# Patient Record
Sex: FEMALE | Employment: OTHER | URBAN - METROPOLITAN AREA
[De-identification: names, ages, dates, MRNs, and addresses within clinical notes are randomized per-mention and may not be internally consistent; named-entity substitution may affect disease eponyms.]

---

## 2024-02-26 ENCOUNTER — HOSPITAL ENCOUNTER (OUTPATIENT)
Dept: PHYSICAL THERAPY | Age: 69
Setting detail: THERAPIES SERIES
Discharge: HOME | End: 2024-02-26
Payer: MEDICARE

## 2024-02-26 DIAGNOSIS — M25.512 LEFT SHOULDER PAIN, UNSPECIFIED CHRONICITY: Primary | ICD-10-CM

## 2024-02-26 DIAGNOSIS — S42.202A UNSPECIFIED FRACTURE OF UPPER END OF LEFT HUMERUS, INITIAL ENCOUNTER FOR CLOSED FRACTURE: ICD-10-CM

## 2024-02-26 PROCEDURE — 97140 MANUAL THERAPY 1/> REGIONS: CPT

## 2024-02-26 PROCEDURE — 97110 THERAPEUTIC EXERCISES: CPT

## 2024-02-26 PROCEDURE — 97161 PT EVAL LOW COMPLEX 20 MIN: CPT

## 2024-02-29 ENCOUNTER — HOSPITAL ENCOUNTER (OUTPATIENT)
Dept: PHYSICAL THERAPY | Age: 69
Setting detail: THERAPIES SERIES
Discharge: HOME | End: 2024-02-29
Payer: MEDICARE

## 2024-02-29 PROCEDURE — 97140 MANUAL THERAPY 1/> REGIONS: CPT

## 2024-02-29 PROCEDURE — 97110 THERAPEUTIC EXERCISES: CPT

## 2024-02-29 NOTE — FLOWSHEET NOTE
10% or less for the Quick DASH to assist with return top prior level of function.   Status: [] Progressing: [] Met: [] Not Met: [] Adjusted  Patient will improve L shoulder ROM to within 5 degrees of uninvolved, dominant R UE  to allow for proper joint functioning as indicated by patients functional deficits.  Status: [] Progressing: [] Met: [] Not Met: [] Adjusted  Patient will improve L shoulder strength to within at least 5# HHD compared to uninvolved, dominant R UE to work towards return to PLOF.    Status: [] Progressing: [] Met: [] Not Met: [] Adjusted  Patient will be able to perform all bathing and dressing tasks including washing her hair, washing her back, reaching backwards into a coat sleeve, etc without increased symptoms or restriction to work towards return to prior level of function.        Status: [] Progressing: [] Met: [] Not Met: [] Adjusted  Patient will be able to perform all household chores including washing dishes and putting them away in the cabinets, vacuuming, and laundry without increased symptoms or restriction to work towards return to PLOF.  Status: [] Progressing: [] Met: [] Not Met: [] Adjusted  Patient will be able to comfortably drive at least 1 hour without increased symptoms or restriction in L UE.    Status: [] Progressing: [] Met: [] Not Met: [] Adjusted    TREATMENT PLAN     Frequency/Duration: 1-2x/week for 4-6 weeks for the following treatment interventions:    Interventions:  [x] Therapeutic exercise including: strength training, ROM, including postural re-education.   [x] NMR activation and proprioception, including postural re-education.    [x] Manual therapy as indicated to include: PROM, Gr I-IV mobilizations, STM, and Dry Needling/IASTM  [x] Modalities as needed that may include:  DN/estim  [x] Patient education on joint protection, postural re-education, activity modification, progression of HEP.        [] Aquatic Therapy    Plan: POC initiated as per

## 2024-03-04 ENCOUNTER — HOSPITAL ENCOUNTER (OUTPATIENT)
Dept: PHYSICAL THERAPY | Age: 69
Setting detail: THERAPIES SERIES
Discharge: HOME | End: 2024-03-04
Payer: MEDICARE

## 2024-03-04 PROCEDURE — 97110 THERAPEUTIC EXERCISES: CPT

## 2024-03-04 PROCEDURE — 97140 MANUAL THERAPY 1/> REGIONS: CPT

## 2024-03-04 NOTE — FLOWSHEET NOTE
[facility] - Outpatient Rehabilitation and Therapy [address] [place] [address] office: [phone] fax: [phone]    Physical Therapy: TREATMENT/PROGRESS NOTE   Patient: Navya Domínguez (68 y.o. female)   Examination Date: 2024   :  1955 MRN: [id number]   Visit #: 3   Insurance Allowable Auth Needed   [place]  9 visits approved []Yes    [x]No    Insurance: Payor: AETNA MEDICARE / Plan: AETNA MEDICARE-ADVANTAGE PPO / Product Type: Medicare /   Insurance ID: [id number] - (Medicare Managed)  Secondary Insurance (if applicable):    Treatment Diagnosis:   1. Left shoulder pain, unspecified chronicity  M25.512         2. Unspecified fracture of upper end of left humerus, initial encounter for closed fracture  S42         Medical Diagnosis:  Left shoulder pain [M25.512]   Referring Physician: Jose Angel Arriaga DO  PCP: Bird Joaquin PA       Plan of care signed (Y/N): Y    Date of Patient follow up with Physician:      Progress Report/POC: 3/29/24  POC update due: (10 visits /OR AUTH LIMITS, whichever is less)  4/3/2024                                             Precautions/ Contra-indications:           Latex allergy:  NO  Pacemaker:    NO  Contraindications for Manipulation: None  Date of Surgery: NA  Other:    Red Flags:  None    C-SSRS Triggered by Intake questionnaire:   [x] No, Questionnaire did not trigger screening.   [] Yes, Patient intake triggered further evaluation      [] C-SSRS Screening completed  [] PCP notified via Plan of Care  [] Emergency services notified     Preferred Language for Healthcare:   [x] English       [] other:    SUBJECTIVE EXAMINATION     Patient stated complaint:   Still getting pretty sore in her L shoulder after performing HEP at home.  Usually soreness lasts about an hour after HEP performance. Has intermittently iced her shoulder to help with that soreness. Trying not to take any ibuprofen or Tylenol if she can help it.        Test used

## 2024-03-07 ENCOUNTER — HOSPITAL ENCOUNTER (OUTPATIENT)
Dept: PHYSICAL THERAPY | Age: 69
Setting detail: THERAPIES SERIES
Discharge: HOME | End: 2024-03-07
Payer: MEDICARE

## 2024-03-07 PROCEDURE — 97140 MANUAL THERAPY 1/> REGIONS: CPT

## 2024-03-07 PROCEDURE — 97110 THERAPEUTIC EXERCISES: CPT

## 2024-03-07 NOTE — FLOWSHEET NOTE
symptoms or restriction to work towards return to PLOF.  Status: [] Progressing: [] Met: [] Not Met: [] Adjusted  Patient will be able to comfortably drive at least 1 hour without increased symptoms or restriction in L UE.    Status: [] Progressing: [] Met: [] Not Met: [] Adjusted    TREATMENT PLAN     Frequency/Duration: 1-2x/week for 4-6 weeks for the following treatment interventions:    Interventions:  [x] Therapeutic exercise including: strength training, ROM, including postural re-education.   [x] NMR activation and proprioception, including postural re-education.    [x] Manual therapy as indicated to include: PROM, Gr I-IV mobilizations, STM, and Dry Needling/IASTM  [x] Modalities as needed that may include:  DN/estim  [x] Patient education on joint protection, postural re-education, activity modification, progression of HEP.        [] Aquatic Therapy    Plan: POC initiated as per evaluation    Electronically Signed by Gloria Moraes PT  Date: 03/07/2024    Note: Portions of this note have been templated and/or copied from initial evaluation, reassessments and prior notes for documentation efficiency.

## 2024-03-11 ENCOUNTER — HOSPITAL ENCOUNTER (OUTPATIENT)
Dept: PHYSICAL THERAPY | Age: 69
Setting detail: THERAPIES SERIES
Discharge: HOME | End: 2024-03-11
Payer: MEDICARE

## 2024-03-11 PROCEDURE — 97110 THERAPEUTIC EXERCISES: CPT

## 2024-03-11 PROCEDURE — 97140 MANUAL THERAPY 1/> REGIONS: CPT

## 2024-03-11 NOTE — FLOWSHEET NOTE
Minutes 43        Charge Justification:  (84839) THERAPEUTIC EXERCISE - Provided verbal/tactile cueing for activities related to strengthening, flexibility, endurance, ROM performed to prevent loss of range of motion, maintain or improve muscular strength or increase flexibility, following either an injury or surgery.   (05837) HOME EXERCISE PROGRAM - Reviewed/Progressed HEP activities related to strengthening, flexibility, endurance, ROM performed to prevent loss of range of motion, maintain or improve muscular strength or increase flexibility, following either an injury or surgery.  (29866) NEUROMUSCULAR RE-EDUCATION - Therapeutic procedure, 1 or more areas, each 15 minutes; neuromuscular reeducation of movement, balance, coordination, kinesthetic sense, posture, and/or proprioception for sitting and/or standing activities  (72903) HOME EXERCISE PROGRAM - Reviewed/Progressed HEP activities related to neuromuscular reeducation of movement, balance, coordination, kinesthetic sense, posture, and/or proprioception for sitting and/or standing activities    (42058) THERAPEUTIC ACTIVITY - use of dynamic activities to improve functional performance. (Ex include squatting, ascending/descending stairs, walking, bending, lifting, catching, throwing, pushing, pulling, jumping.)  Direct, one on one contact, billed in 15-minute increments.  (89661) MANUAL THERAPY -  Manual therapy techniques, 1 or more regions, each 15 minutes (Mobilization/manipulation, manual lymphatic drainage, manual traction) for the purpose of modulating pain, promoting relaxation,  increasing ROM, reducing/eliminating soft tissue swelling/inflammation/restriction, improving soft tissue extensibility and allowing for proper ROM for normal function with self care, mobility, lifting and ambulation  (44171) Needle insertion(s) without injection; 1 or 2 muscle(s).  (63365) Needle insertion(s) without injection; 3 or more muscle(s)  (17493) ATTENDED ESTIM.

## 2024-03-14 ENCOUNTER — HOSPITAL ENCOUNTER (OUTPATIENT)
Dept: PHYSICAL THERAPY | Age: 69
Setting detail: THERAPIES SERIES
Discharge: HOME | End: 2024-03-14
Payer: MEDICARE

## 2024-03-14 PROCEDURE — 97110 THERAPEUTIC EXERCISES: CPT

## 2024-03-14 PROCEDURE — 97140 MANUAL THERAPY 1/> REGIONS: CPT

## 2024-03-14 NOTE — FLOWSHEET NOTE
[facility] - Outpatient Rehabilitation and Therapy [address] [place] [address] office: [phone] fax: [phone]    Physical Therapy: TREATMENT/PROGRESS NOTE   Patient: Navya Domínguez (68 y.o. female)   Examination Date: 2024   :  1955 MRN: [id number]   Visit #: 6   Insurance Allowable Auth Needed   [place] []Yes    [x]No    Insurance: Payor: AETNA MEDICARE / Plan: AETNA MEDICARE-ADVANTAGE PPO / Product Type: Medicare /   Insurance ID: [id number] - (Medicare Managed)  Secondary Insurance (if applicable):    Treatment Diagnosis:   1. Left shoulder pain, unspecified chronicity  M25.512         2. Unspecified fracture of upper end of left humerus, initial encounter for closed fracture  S42         Medical Diagnosis:  Left shoulder pain [M25.512]   Referring Physician: Jose Angel Arriaga DO  PCP: Bird Joaquin PA       Plan of care signed (Y/N): Y    Date of Patient follow up with Physician:      Progress Report/POC: 3/29/24  POC update due: (10 visits /OR AUTH LIMITS, whichever is less)  2024                                             Precautions/ Contra-indications:           Latex allergy:  NO  Pacemaker:    NO  Contraindications for Manipulation: None  Date of Surgery: NA  Other:    Red Flags:  None    C-SSRS Triggered by Intake questionnaire:   [x] No, Questionnaire did not trigger screening.   [] Yes, Patient intake triggered further evaluation      [] C-SSRS Screening completed  [] PCP notified via Plan of Care  [] Emergency services notified     Preferred Language for Healthcare:   [x] English       [] other:    SUBJECTIVE EXAMINATION     Patient stated complaint:   States that for the first time in 15 weeks she was able to pull her arm out of her long sleeve sweatshirt above her head and she was able to straighten her hair using her L arm with only mild discomfort in the L shoulder.        Test used Initial score  2024   Pain Summary VAS 4-7/10

## 2024-03-18 ENCOUNTER — HOSPITAL ENCOUNTER (OUTPATIENT)
Dept: PHYSICAL THERAPY | Age: 69
Setting detail: THERAPIES SERIES
Discharge: HOME | End: 2024-03-18
Payer: MEDICARE

## 2024-03-18 PROCEDURE — 97110 THERAPEUTIC EXERCISES: CPT

## 2024-03-18 PROCEDURE — 97140 MANUAL THERAPY 1/> REGIONS: CPT

## 2024-03-18 NOTE — FLOWSHEET NOTE
Aquatic Therex (62156)    Dry Needle 3+ muscle (00926)     Iontophoresis (31180)    VASO (44021)     Ultrasound (29525)    Group Therapy (93831)     Estim Attended (03785)    Canalith Repositioning (63740)     Other:    Other:    Total Timed Code Tx Minutes 45 3       Total Treatment Minutes 45        Charge Justification:  (83734) THERAPEUTIC EXERCISE - Provided verbal/tactile cueing for activities related to strengthening, flexibility, endurance, ROM performed to prevent loss of range of motion, maintain or improve muscular strength or increase flexibility, following either an injury or surgery.   (62736) HOME EXERCISE PROGRAM - Reviewed/Progressed HEP activities related to strengthening, flexibility, endurance, ROM performed to prevent loss of range of motion, maintain or improve muscular strength or increase flexibility, following either an injury or surgery.  (70917) NEUROMUSCULAR RE-EDUCATION - Therapeutic procedure, 1 or more areas, each 15 minutes; neuromuscular reeducation of movement, balance, coordination, kinesthetic sense, posture, and/or proprioception for sitting and/or standing activities  (84736) HOME EXERCISE PROGRAM - Reviewed/Progressed HEP activities related to neuromuscular reeducation of movement, balance, coordination, kinesthetic sense, posture, and/or proprioception for sitting and/or standing activities    (91819) THERAPEUTIC ACTIVITY - use of dynamic activities to improve functional performance. (Ex include squatting, ascending/descending stairs, walking, bending, lifting, catching, throwing, pushing, pulling, jumping.)  Direct, one on one contact, billed in 15-minute increments.  (59470) MANUAL THERAPY -  Manual therapy techniques, 1 or more regions, each 15 minutes (Mobilization/manipulation, manual lymphatic drainage, manual traction) for the purpose of modulating pain, promoting relaxation,  increasing ROM, reducing/eliminating soft tissue swelling/inflammation/restriction, improving

## 2024-03-21 ENCOUNTER — HOSPITAL ENCOUNTER (OUTPATIENT)
Dept: PHYSICAL THERAPY | Age: 69
Setting detail: THERAPIES SERIES
Discharge: HOME | End: 2024-03-21
Payer: MEDICARE

## 2024-03-21 PROCEDURE — 97140 MANUAL THERAPY 1/> REGIONS: CPT

## 2024-03-21 PROCEDURE — 97110 THERAPEUTIC EXERCISES: CPT

## 2024-03-21 NOTE — FLOWSHEET NOTE
laundry without increased symptoms or restriction to work towards return to PLOF.  Status: [] Progressing: [] Met: [] Not Met: [] Adjusted  Patient will be able to comfortably drive at least 1 hour without increased symptoms or restriction in L UE.    Status: [] Progressing: [] Met: [] Not Met: [] Adjusted    TREATMENT PLAN     Frequency/Duration: 1-2x/week for 4-6 weeks for the following treatment interventions:    Interventions:  [x] Therapeutic exercise including: strength training, ROM, including postural re-education.   [x] NMR activation and proprioception, including postural re-education.    [x] Manual therapy as indicated to include: PROM, Gr I-IV mobilizations, STM, and Dry Needling/IASTM  [x] Modalities as needed that may include:  DN/estim  [x] Patient education on joint protection, postural re-education, activity modification, progression of HEP.        [] Aquatic Therapy    Plan: 2x per week for 4-6 weeks    Electronically Signed by Gloria Moraes, GIOVANNA  Date: 03/21/2024    Note: Portions of this note have been templated and/or copied from initial evaluation, reassessments and prior notes for documentation efficiency.

## 2024-03-25 ENCOUNTER — APPOINTMENT (OUTPATIENT)
Dept: PHYSICAL THERAPY | Age: 69
End: 2024-03-25
Payer: MEDICARE

## 2024-03-28 ENCOUNTER — HOSPITAL ENCOUNTER (OUTPATIENT)
Dept: PHYSICAL THERAPY | Age: 69
Setting detail: THERAPIES SERIES
Discharge: HOME | End: 2024-03-28
Payer: MEDICARE

## 2024-03-28 PROCEDURE — 97110 THERAPEUTIC EXERCISES: CPT

## 2024-03-28 PROCEDURE — 97140 MANUAL THERAPY 1/> REGIONS: CPT

## 2024-03-28 NOTE — FLOWSHEET NOTE
before she goes back to see physician next Tuesday.        Test used Initial score  2/26/24 03/28/2024   Pain Summary VAS 4-7/10    Functional questionnaire Quick DASH 30% disability     Other:              Pain:  Pain location: L superior, lateral shoulder  Patient describes pain to be constant, intermittent, Sharp, dull, and aching  Pain decreases with: Medication  Pain increases with: Activity and Movement, reaching behind her back, bathing, dressing, driving, reaching to put her seat belt on with her L arm, sleeping directly on the L side, lifting any weight >5# with L UE     Relevant Medical History: High BP. Anxiety. History of adhesive capsulitis in both shoulders.    Occupation/School:  Work/School Status: Retired    Job Duties/Demands: Takes care of her grand kids now as needed    Sport/ Recreation/ Leisure/ Hobbies: Patient wanted to start working out in January, but she hasn't been able to do that because of this injury.     OBJECTIVE EXAMINATION     2/26/24  ROM/Strength: (Blank cells denote NT)    Mvmt (norm) AROM L AROM R Notes PROM L PROM R Notes               CERVICAL Flex (60)        Ext (70)        SB(45)          Rotation (80)                       SHOULDER Flexion (180) 85 p! Beyond that range. 155        Abduction (180) 73 p! Beyond that range. 142        ER -0 L-sided neck, occiput C5 Discomfort in posterior R shoulder.        ER -90 (90)          IR -0 L buttock, hip T8        IR -90 (70)                     ELBOW Flex/biceps (140)          Ext/triceps (0)          Pronation (80)          Supination (80)                       WRIST Flexion (60)          Extension (60)          RD (20)          UD (20)                                   MMT L MMT R Notes            CERVICAL Cerv flexion       Cerv extension       Cerv SB       Cerv rotation                 SHOULDER Flexion       Abduction       ER -0       ER -90       IR -0       IR -90               ELBOW Flex/biceps

## 2024-04-01 ENCOUNTER — HOSPITAL ENCOUNTER (OUTPATIENT)
Dept: PHYSICAL THERAPY | Age: 69
Setting detail: THERAPIES SERIES
Discharge: HOME | End: 2024-04-01
Payer: MEDICARE

## 2024-04-01 PROCEDURE — 97110 THERAPEUTIC EXERCISES: CPT

## 2024-04-01 PROCEDURE — 97140 MANUAL THERAPY 1/> REGIONS: CPT

## 2024-04-04 ENCOUNTER — HOSPITAL ENCOUNTER (OUTPATIENT)
Dept: PHYSICAL THERAPY | Age: 69
Setting detail: THERAPIES SERIES
Discharge: HOME | End: 2024-04-04
Payer: MEDICARE

## 2024-04-04 PROCEDURE — 97140 MANUAL THERAPY 1/> REGIONS: CPT

## 2024-04-04 PROCEDURE — 97110 THERAPEUTIC EXERCISES: CPT

## 2024-04-04 NOTE — FLOWSHEET NOTE
activities  (12144) HOME EXERCISE PROGRAM - Reviewed/Progressed HEP activities related to neuromuscular reeducation of movement, balance, coordination, kinesthetic sense, posture, and/or proprioception for sitting and/or standing activities    (77468) THERAPEUTIC ACTIVITY - use of dynamic activities to improve functional performance. (Ex include squatting, ascending/descending stairs, walking, bending, lifting, catching, throwing, pushing, pulling, jumping.)  Direct, one on one contact, billed in 15-minute increments.  (50862) MANUAL THERAPY -  Manual therapy techniques, 1 or more regions, each 15 minutes (Mobilization/manipulation, manual lymphatic drainage, manual traction) for the purpose of modulating pain, promoting relaxation,  increasing ROM, reducing/eliminating soft tissue swelling/inflammation/restriction, improving soft tissue extensibility and allowing for proper ROM for normal function with self care, mobility, lifting and ambulation  (15410) Needle insertion(s) without injection; 1 or 2 muscle(s).  (21510) Needle insertion(s) without injection; 3 or more muscle(s)  (24260) ATTENDED ESTIM. Application of a modality to 1 or more areas; electrical stimulation (manual), each 15 minutes. Attended electrical stimulation requires direct (1-on-1) contact with the patient by the qualified professional/qualified personnel in providing electrical stimulation manually through the use of probes or other devices.      GOALS     Patient stated goal: Be able to do normal daily activities.  Status:  [x] Progressing: [] Met: [x] Not Met: [] Adjusted  Therapist goals for Patient:   Short Term Goals: To be achieved in: 2 weeks  Independent in HEP and progression per patient tolerance, in order to progress toward full function and prevent re-injury.    Status: [] Progressing: [x] Met: [] Not Met: [] Adjusted  Patient will have a decrease in pain to </=2/10 to help facilitate improvement in movement, function, and ADLs as

## 2024-04-08 ENCOUNTER — HOSPITAL ENCOUNTER (OUTPATIENT)
Dept: PHYSICAL THERAPY | Age: 69
Setting detail: THERAPIES SERIES
Discharge: HOME | End: 2024-04-08
Payer: MEDICARE

## 2024-04-08 PROCEDURE — 97140 MANUAL THERAPY 1/> REGIONS: CPT

## 2024-04-08 PROCEDURE — 97110 THERAPEUTIC EXERCISES: CPT

## 2024-04-08 NOTE — FLOWSHEET NOTE
injury. Will send to concussion specialist if dizziness does not continue to improve in the next few weeks. Reports a lot less soreness in her L shoulder today than she had on Thursday last week.        Test used Initial score  2/26/24 04/08/2024   Pain Summary VAS 4-7/10 0-4/10   Functional questionnaire Quick DASH 30% disability  30% disability   Other:              Pain:  Pain location: L superior, lateral shoulder  Patient describes pain to be constant, intermittent, Sharp, dull, and aching  Pain decreases with: Medication  Pain increases with: Activity and Movement, reaching behind her back, bathing, dressing, driving, reaching to put her seat belt on with her L arm, sleeping directly on the L side, lifting any weight >5# with L UE     Relevant Medical History: High BP. Anxiety. History of adhesive capsulitis in both shoulders.    Occupation/School:  Work/School Status: Retired    Job Duties/Demands: Takes care of her grand kids now as needed    Sport/ Recreation/ Leisure/ Hobbies: Patient wanted to start working out in January, but she hasn't been able to do that because of this injury.     OBJECTIVE EXAMINATION     4/1/24  ROM/Strength: (Blank cells denote NT)    Mvmt (norm) AROM L AROM R Notes PROM L PROM R Notes               CERVICAL Flex (60)        Ext (70)        SB(45)          Rotation (80)                       SHOULDER Flexion (180) 120/140 p! At end range 155        Abduction (180) 90/120 p! At end range 142        ER -0 C5/ 90 in scapular plane; 85 from 90/90 p! At end range. C5 Discomfort in posterior R shoulder.        ER -90 (90)          IR -0 L buttock, hip T8        IR -90 (70)                     ELBOW Flex/biceps (140)          Ext/triceps (0)          Pronation (80)          Supination (80)                       WRIST Flexion (60)          Extension (60)          RD (20)          UD (20)                                   MMT L MMT R Notes            CERVICAL Cerv flexion

## 2024-04-11 ENCOUNTER — HOSPITAL ENCOUNTER (OUTPATIENT)
Dept: PHYSICAL THERAPY | Age: 69
Setting detail: THERAPIES SERIES
Discharge: HOME | End: 2024-04-11
Payer: MEDICARE

## 2024-04-11 PROCEDURE — 97140 MANUAL THERAPY 1/> REGIONS: CPT

## 2024-04-11 PROCEDURE — 97110 THERAPEUTIC EXERCISES: CPT

## 2024-04-11 NOTE — FLOWSHEET NOTE
[facility] - Outpatient Rehabilitation and Therapy [address] [place] [address] office: [phone] fax: [phone]    Physical Therapy: TREATMENT/PROGRESS NOTE   Patient: Navya Domínguez (68 y.o. female)   Examination Date: 2024   :  1955 MRN: [id number]   Visit #: 13   Insurance Allowable Auth Needed   MN []Yes    [x]No    Insurance: Payor: AETNA MEDICARE / Plan: AETNA MEDICARE-ADVANTAGE PPO / Product Type: Medicare /   Insurance ID: [id number] - (Medicare Managed)  Secondary Insurance (if applicable):    Treatment Diagnosis:   1. Left shoulder pain, unspecified chronicity  M25.512         2. Unspecified fracture of upper end of left humerus, initial encounter for closed fracture  S42         Medical Diagnosis:  Left shoulder pain [M25.512]   Referring Physician: Jose Angel Arriaga DO  PCP: Bird Joaquin PA       Plan of care signed (Y/N): Y    Date of Patient follow up with Physician:      Progress Report/POC: 24  POC update due: (10 visits /OR AUTH LIMITS, whichever is less)  5/10/2024                                             Precautions/ Contra-indications:           Latex allergy:  NO  Pacemaker:    NO  Contraindications for Manipulation: None  Date of Surgery: NA  Other:    Red Flags:  None    C-SSRS Triggered by Intake questionnaire:   [x] No, Questionnaire did not trigger screening.   [] Yes, Patient intake triggered further evaluation      [] C-SSRS Screening completed  [] PCP notified via Plan of Care  [] Emergency services notified     Preferred Language for Healthcare:   [x] English       [] other:    SUBJECTIVE EXAMINATION     Patient stated complaint:  Pretty sore in the front of the L shoulder after stretching behind back with towel last session. Still a little sore there today even.        Test used Initial score  2024   Pain Summary VAS 4-7/10 0-4/10   Functional questionnaire Quick DASH 30% disability  30% disability   Other:

## 2024-04-15 ENCOUNTER — HOSPITAL ENCOUNTER (OUTPATIENT)
Dept: PHYSICAL THERAPY | Age: 69
Setting detail: THERAPIES SERIES
Discharge: HOME | End: 2024-04-15
Payer: MEDICARE

## 2024-04-15 PROCEDURE — 97110 THERAPEUTIC EXERCISES: CPT

## 2024-04-15 PROCEDURE — 97140 MANUAL THERAPY 1/> REGIONS: CPT

## 2024-04-18 ENCOUNTER — HOSPITAL ENCOUNTER (OUTPATIENT)
Dept: PHYSICAL THERAPY | Age: 69
Setting detail: THERAPIES SERIES
Discharge: HOME | End: 2024-04-18
Payer: MEDICARE

## 2024-04-18 PROCEDURE — 97140 MANUAL THERAPY 1/> REGIONS: CPT

## 2024-04-18 PROCEDURE — 97110 THERAPEUTIC EXERCISES: CPT

## 2024-04-18 NOTE — FLOWSHEET NOTE
[facility] - Outpatient Rehabilitation and Therapy [address] [place] [address] office: [phone] fax: [phone]    Physical Therapy: TREATMENT/PROGRESS NOTE   Patient: Navya Domínguez (68 y.o. female)   Examination Date: 2024   :  1955 MRN: [id number]   Visit #: 15   Insurance Allowable Auth Needed   MN []Yes    [x]No    Insurance: Payor: AETNA MEDICARE / Plan: AETNA MEDICARE-ADVANTAGE PPO / Product Type: Medicare /   Insurance ID: [id number] - (Medicare Managed)  Secondary Insurance (if applicable):    Treatment Diagnosis:   1. Left shoulder pain, unspecified chronicity  M25.512         2. Unspecified fracture of upper end of left humerus, initial encounter for closed fracture  S42         Medical Diagnosis:  Left shoulder pain [M25.512]   Referring Physician: Jose Angel Arriaga DO  PCP: Bird Joaquin PA       Plan of care signed (Y/N): Y    Date of Patient follow up with Physician:      Progress Report/POC: 24  POC update due: (10 visits /OR AUTH LIMITS, whichever is less)  2024                                             Precautions/ Contra-indications:           Latex allergy:  NO  Pacemaker:    NO  Contraindications for Manipulation: None  Date of Surgery: NA  Other:    Red Flags:  None    C-SSRS Triggered by Intake questionnaire:   [x] No, Questionnaire did not trigger screening.   [] Yes, Patient intake triggered further evaluation      [] C-SSRS Screening completed  [] PCP notified via Plan of Care  [] Emergency services notified     Preferred Language for Healthcare:   [x] English       [] other:    SUBJECTIVE EXAMINATION     Patient stated complaint:   Felt like she tolerated all the stretching activities a lot better after last session.  Did not seem as sore in the L shoulder afterwards. Feels like she can get her hand behind her back better and farther even though it is still painful to do so.        Test used Initial score  2024

## 2024-04-22 ENCOUNTER — HOSPITAL ENCOUNTER (OUTPATIENT)
Dept: PHYSICAL THERAPY | Age: 69
Setting detail: THERAPIES SERIES
Discharge: HOME | End: 2024-04-22
Payer: MEDICARE

## 2024-04-22 PROCEDURE — 97140 MANUAL THERAPY 1/> REGIONS: CPT

## 2024-04-22 PROCEDURE — 97110 THERAPEUTIC EXERCISES: CPT

## 2024-04-22 NOTE — FLOWSHEET NOTE
[facility] - Outpatient Rehabilitation and Therapy [address] [place] [address] office: [phone] fax: [phone]    Physical Therapy: TREATMENT/PROGRESS NOTE   Patient: Navya Domínguez (68 y.o. female)   Examination Date: 2024   :  1955 MRN: [id number]   Visit #: 16   Insurance Allowable Auth Needed   MN []Yes    [x]No    Insurance: Payor: AETNA MEDICARE / Plan: AETNA MEDICARE-ADVANTAGE PPO / Product Type: Medicare /   Insurance ID: [id number] - (Medicare Managed)  Secondary Insurance (if applicable):    Treatment Diagnosis:   1. Left shoulder pain, unspecified chronicity  M25.512         2. Unspecified fracture of upper end of left humerus, initial encounter for closed fracture  S42         Medical Diagnosis:  Left shoulder pain [M25.512]   Referring Physician: Jose Angel Arriaga DO  PCP: Bird Joaquin PA       Plan of care signed (Y/N): Y    Date of Patient follow up with Physician:      Progress Report/POC: 24  POC update due: (10 visits /OR AUTH LIMITS, whichever is less)  2024                                             Precautions/ Contra-indications:           Latex allergy:  NO  Pacemaker:    NO  Contraindications for Manipulation: None  Date of Surgery: NA  Other:    Red Flags:  None    C-SSRS Triggered by Intake questionnaire:   [x] No, Questionnaire did not trigger screening.   [] Yes, Patient intake triggered further evaluation      [] C-SSRS Screening completed  [] PCP notified via Plan of Care  [] Emergency services notified     Preferred Language for Healthcare:   [x] English       [] other:    SUBJECTIVE EXAMINATION     Patient stated complaint:   States that she actually had muscle soreness in the back of her L shoulder after last session because it felt different than her soreness before.  She was able to blow dry her hair yesterday using her L arm for longer as well.  That felt like a huge accomplishment.        Test used Initial

## 2024-04-25 ENCOUNTER — HOSPITAL ENCOUNTER (OUTPATIENT)
Dept: PHYSICAL THERAPY | Age: 69
Setting detail: THERAPIES SERIES
Discharge: HOME | End: 2024-04-25
Payer: MEDICARE

## 2024-04-25 PROCEDURE — 97140 MANUAL THERAPY 1/> REGIONS: CPT

## 2024-04-25 PROCEDURE — 97110 THERAPEUTIC EXERCISES: CPT

## 2024-04-25 NOTE — FLOWSHEET NOTE
(31884)    Dry Needle 3+ muscle (94921)     Iontophoresis (93768)    VASO (73044)     Ultrasound (73126)    Group Therapy (13295)     Estim Attended (75264)    Canalith Repositioning (03856)     Other:    Other:    Total Timed Code Tx Minutes 52 3       Total Treatment Minutes 52        Charge Justification:  (59439) THERAPEUTIC EXERCISE - Provided verbal/tactile cueing for activities related to strengthening, flexibility, endurance, ROM performed to prevent loss of range of motion, maintain or improve muscular strength or increase flexibility, following either an injury or surgery.   (08991) HOME EXERCISE PROGRAM - Reviewed/Progressed HEP activities related to strengthening, flexibility, endurance, ROM performed to prevent loss of range of motion, maintain or improve muscular strength or increase flexibility, following either an injury or surgery.  (40671) NEUROMUSCULAR RE-EDUCATION - Therapeutic procedure, 1 or more areas, each 15 minutes; neuromuscular reeducation of movement, balance, coordination, kinesthetic sense, posture, and/or proprioception for sitting and/or standing activities  (68206) HOME EXERCISE PROGRAM - Reviewed/Progressed HEP activities related to neuromuscular reeducation of movement, balance, coordination, kinesthetic sense, posture, and/or proprioception for sitting and/or standing activities    (71480) THERAPEUTIC ACTIVITY - use of dynamic activities to improve functional performance. (Ex include squatting, ascending/descending stairs, walking, bending, lifting, catching, throwing, pushing, pulling, jumping.)  Direct, one on one contact, billed in 15-minute increments.  (91371) MANUAL THERAPY -  Manual therapy techniques, 1 or more regions, each 15 minutes (Mobilization/manipulation, manual lymphatic drainage, manual traction) for the purpose of modulating pain, promoting relaxation,  increasing ROM, reducing/eliminating soft tissue swelling/inflammation/restriction, improving soft tissue

## 2024-04-29 ENCOUNTER — HOSPITAL ENCOUNTER (OUTPATIENT)
Dept: PHYSICAL THERAPY | Age: 69
Setting detail: THERAPIES SERIES
Discharge: HOME | End: 2024-04-29
Payer: MEDICARE

## 2024-04-29 PROCEDURE — 97110 THERAPEUTIC EXERCISES: CPT

## 2024-04-29 PROCEDURE — 97140 MANUAL THERAPY 1/> REGIONS: CPT

## 2024-04-29 NOTE — FLOWSHEET NOTE
patients functional deficits.  Status: [x] Progressing: [] Met: [x] Not Met: [] Adjusted  Comment:  See objective measures above.  Patient will improve L shoulder strength to within at least 5# HHD compared to uninvolved, dominant R UE to work towards return to PLOF.    Status: [x] Progressing: [] Met: [x] Not Met: [] Adjusted  Patient will be able to perform all bathing and dressing tasks including washing her hair, washing her back, reaching backwards into a coat sleeve, etc without increased symptoms or restriction to work towards return to prior level of function.        Status: [x] Progressing: [] Met: [x] Not Met: [] Adjusted  Patient will be able to perform all household chores including washing dishes and putting them away in the cabinets, vacuuming, and laundry without increased symptoms or restriction to work towards return to PLOF.    Status: [x] Progressing: [] Met: [x] Not Met: [] Adjusted  Patient will be able to comfortably drive at least 1 hour without increased symptoms or restriction in L UE.    Status: [x] Progressing: [] Met: [x] Not Met: [] Adjusted    TREATMENT PLAN     Frequency/Duration: 1-2x/week for 4-6 weeks for the following treatment interventions:    Interventions:  [x] Therapeutic exercise including: strength training, ROM, including postural re-education.   [x] NMR activation and proprioception, including postural re-education.    [x] Manual therapy as indicated to include: PROM, Gr I-IV mobilizations, STM, and Dry Needling/IASTM  [x] Modalities as needed that may include:  DN/estim  [x] Patient education on joint protection, postural re-education, activity modification, progression of HEP.        [] Aquatic Therapy    Plan: 2x per week for 4-6 weeks    Electronically Signed by Gloria Moraes PT  Date: 04/29/2024    Note: Portions of this note have been templated and/or copied from initial evaluation, reassessments and prior notes for documentation efficiency.

## 2024-05-02 ENCOUNTER — HOSPITAL ENCOUNTER (OUTPATIENT)
Dept: PHYSICAL THERAPY | Age: 69
Setting detail: THERAPIES SERIES
Discharge: HOME | End: 2024-05-02
Payer: MEDICARE

## 2024-05-02 PROCEDURE — 97110 THERAPEUTIC EXERCISES: CPT

## 2024-05-02 PROCEDURE — 97140 MANUAL THERAPY 1/> REGIONS: CPT

## 2024-05-02 NOTE — PLAN OF CARE
restriction to work towards return to PLOF.    Status: [x] Progressing: [] Met: [x] Not Met: [] Adjusted  Patient will be able to comfortably drive at least 1 hour without increased symptoms or restriction in L UE.    Status: [x] Progressing: [] Met: [x] Not Met: [] Adjusted    TREATMENT PLAN     Frequency/Duration: 1-2x/week for 4-6 weeks for the following treatment interventions:    Interventions:  [x] Therapeutic exercise including: strength training, ROM, including postural re-education.   [x] NMR activation and proprioception, including postural re-education.    [x] Manual therapy as indicated to include: PROM, Gr I-IV mobilizations, STM, and Dry Needling/IASTM  [x] Modalities as needed that may include:  DN/estim  [x] Patient education on joint protection, postural re-education, activity modification, progression of HEP.        [] Aquatic Therapy    Plan: Cont manual techniques, self stretches to address posterior capsule, GHJ, CT junction, and T/S restrictions. Progress L shoulder strength.     Electronically Signed by Gloria Moraes PT  Date: 05/02/2024    Note: Portions of this note have been templated and/or copied from initial evaluation, reassessments and prior notes for documentation efficiency.

## 2024-05-06 ENCOUNTER — APPOINTMENT (OUTPATIENT)
Dept: PHYSICAL THERAPY | Age: 69
End: 2024-05-06
Payer: MEDICARE

## 2024-05-09 ENCOUNTER — HOSPITAL ENCOUNTER (OUTPATIENT)
Dept: PHYSICAL THERAPY | Age: 69
Setting detail: THERAPIES SERIES
Discharge: HOME | End: 2024-05-09
Payer: MEDICARE

## 2024-05-09 PROCEDURE — 97140 MANUAL THERAPY 1/> REGIONS: CPT

## 2024-05-09 PROCEDURE — 97110 THERAPEUTIC EXERCISES: CPT

## 2024-05-09 NOTE — FLOWSHEET NOTE
UD               Posture:   rounded shoulders and flat T/S    Specific Joint Mobility Testing/Accessory Motions:      Glenohumeral joint: hypomobile on R on L  T/S, CT junction mobility very restricted    Special Tests:  [x] None Assessed   [] Following tests noted:    NT       Exercises/Interventions     Therapeutic Ex (48749)  resistance Sets/time Reps Notes/Cues/Progressions   Supine cane OH flexion - varying angles 3# 5s 10 Each angle   Table slides flexion/scaption - seated  5s 10 Each position   Wall washes  1 10    Doorway ER stretch - low elevation  10s 10 GENTLE   Cross body stretch  15s 5    IR towel stretch behind back  10s 10    Posterior capsule stretch - prone   3 min LLLD hold   Seated, sidelying butterflies  5s 10 For posterior capsule mobility   Seated cervical ext w/self mobs at CT junction  5s 6-8 With pillowcase   Seated T/S ext over chair back  5s 10 Hands across chest or on top of head          Prone rows/ext 2#/0# 1-2 10 Cues for scap mechanics   Sidelying ER 2# 1 15 Cues for scap mechanics   Supine shoulder flexion - active 14 oz ball 2 10    CC rows 3.5 pl 2 10 bilat   Supine cane serratus punch 3# 2 10    Sidelying serratus punch 1# 1 15 PT assist   Sidelying shoulder flexion - active 14 oz ball 1 15    Sidelying shoulder abd - active  1 15    TB ER Green 2 10 Towel roll under distal elbow   Standing cane AA flexion - back against wall 1# 1 10  R UE assist as needed   TB ecc flexion - standing Green 1 15    Seated shoulder flex/scap lift offs  3s 10 Each position   Standing active flexion/scaption 14 oz ball 1 10 Each position   TB ecc ext Green 1 12    Seated horiz abd  2 10 Cues for scap mechanics   Standing GH depression with active flexion  1 10           Manual Intervention (03382)  TIME     L GHJ inferior, posterior glides  8  Gr. II-III; sidelying, supine   L shoulder PROM, gentle stretching  8  Sidelying, supine   Prone posterior capsule mobs  0  Gr. II-III   Posterior mobs -

## 2024-05-13 ENCOUNTER — HOSPITAL ENCOUNTER (OUTPATIENT)
Dept: PHYSICAL THERAPY | Age: 69
Setting detail: THERAPIES SERIES
Discharge: HOME | End: 2024-05-13
Payer: MEDICARE

## 2024-05-13 PROCEDURE — 97110 THERAPEUTIC EXERCISES: CPT

## 2024-05-13 PROCEDURE — 97140 MANUAL THERAPY 1/> REGIONS: CPT

## 2024-05-13 NOTE — FLOWSHEET NOTE
L UE.    Status: [x] Progressing: [] Met: [x] Not Met: [] Adjusted    TREATMENT PLAN     Frequency/Duration: 1-2x/week for 4-6 weeks for the following treatment interventions:    Interventions:  [x] Therapeutic exercise including: strength training, ROM, including postural re-education.   [x] NMR activation and proprioception, including postural re-education.    [x] Manual therapy as indicated to include: PROM, Gr I-IV mobilizations, STM, and Dry Needling/IASTM  [x] Modalities as needed that may include:  DN/estim  [x] Patient education on joint protection, postural re-education, activity modification, progression of HEP.        [] Aquatic Therapy    Plan: Cont manual techniques, self stretches to address posterior capsule, GHJ, CT junction, and T/S restrictions. Progress L shoulder strength.     Electronically Signed by Gloria Moraes PT  Date: 05/13/2024    Note: Portions of this note have been templated and/or copied from initial evaluation, reassessments and prior notes for documentation efficiency.

## 2024-05-15 ENCOUNTER — APPOINTMENT (OUTPATIENT)
Dept: PHYSICAL THERAPY | Age: 69
End: 2024-05-15
Payer: MEDICARE

## 2024-05-17 ENCOUNTER — HOSPITAL ENCOUNTER (OUTPATIENT)
Dept: PHYSICAL THERAPY | Age: 69
Setting detail: THERAPIES SERIES
Discharge: HOME | End: 2024-05-17
Payer: MEDICARE

## 2024-05-17 PROCEDURE — 97110 THERAPEUTIC EXERCISES: CPT

## 2024-05-17 PROCEDURE — 97140 MANUAL THERAPY 1/> REGIONS: CPT

## 2024-05-17 NOTE — FLOWSHEET NOTE
[facility] - Outpatient Rehabilitation and Therapy [address] [place] [id number] office: [phone] fax: [phone]    Physical Therapy: TREATMENT/PROGRESS NOTE   Patient: Navya Domínguez (68 y.o. female)   Examination Date: 2024   :  1955 MRN: [id number]   Visit #: 22   Insurance Allowable Auth Needed   [place] []Yes    [x]No    Insurance: Payor: AETNA MEDICARE / Plan: AETNA MEDICARE-ADVANTAGE PPO / Product Type: Medicare /   Insurance ID: [id number] - (Medicare Managed)  Secondary Insurance (if applicable):    Treatment Diagnosis:   1. Left shoulder pain, unspecified chronicity  M25.512         2. Unspecified fracture of upper end of left humerus, initial encounter for closed fracture  S42         Medical Diagnosis:  Left shoulder pain [M25.512]   Referring Physician: Jose Angel Arriaga DO  PCP: Bird Joaquin PA       Plan of care signed (Y/N): Y    Date of Patient follow up with Physician:      Progress Report/POC: 24  POC update due: (10 visits /OR AUTH LIMITS, whichever is less)  2024                                             Precautions/ Contra-indications:           Latex allergy:  NO  Pacemaker:    NO  Contraindications for Manipulation: None  Date of Surgery: NA  Other:    Red Flags:  None    C-SSRS Triggered by Intake questionnaire:   [x] No, Questionnaire did not trigger screening.   [] Yes, Patient intake triggered further evaluation      [] C-SSRS Screening completed  [] PCP notified via Plan of Care  [] Emergency services notified     Preferred Language for Healthcare:   [x] English       [] other:    SUBJECTIVE EXAMINATION     Patient stated complaint:   Saw Dr. Arriaga on Tuesday. Dr. Arriaga seemed happy with patient's L shoulder range of motion and mobility compared to when he saw her last. Decided it was not necessary to have a cortisone injection to the L shoulder at this time. Told her to follow up again if she does not feel like she is

## 2024-05-21 ENCOUNTER — HOSPITAL ENCOUNTER (OUTPATIENT)
Dept: PHYSICAL THERAPY | Age: 69
Setting detail: THERAPIES SERIES
Discharge: HOME | End: 2024-05-21
Payer: MEDICARE

## 2024-05-21 PROCEDURE — 97140 MANUAL THERAPY 1/> REGIONS: CPT

## 2024-05-21 PROCEDURE — 97110 THERAPEUTIC EXERCISES: CPT

## 2024-05-21 NOTE — FLOWSHEET NOTE
education on joint protection, postural re-education, activity modification, progression of HEP.        [] Aquatic Therapy    Plan: Cont manual techniques, self stretches to address posterior capsule, GHJ, CT junction, and T/S restrictions. Progress L shoulder strength.     Electronically Signed by Gloria Moraes, PT  Date: 05/21/2024    Note: Portions of this note have been templated and/or copied from initial evaluation, reassessments and prior notes for documentation efficiency.

## 2024-05-29 ENCOUNTER — HOSPITAL ENCOUNTER (OUTPATIENT)
Dept: PHYSICAL THERAPY | Age: 69
Setting detail: THERAPIES SERIES
Discharge: HOME | End: 2024-05-29
Payer: MEDICARE

## 2024-05-29 PROCEDURE — 97110 THERAPEUTIC EXERCISES: CPT

## 2024-05-29 PROCEDURE — 97140 MANUAL THERAPY 1/> REGIONS: CPT

## 2024-05-29 NOTE — FLOWSHEET NOTE
degrees of uninvolved, dominant R UE  to allow for proper joint functioning as indicated by patients functional deficits.  Status: [x] Progressing: [] Met: [x] Not Met: [] Adjusted  Comment:  See objective measures above.  Patient will improve L shoulder strength to within at least 5# HHD compared to uninvolved, dominant R UE to work towards return to PLOF.    Status: [x] Progressing: [] Met: [x] Not Met: [] Adjusted  Patient will be able to perform all bathing and dressing tasks including washing her hair, washing her back, reaching backwards into a coat sleeve, etc without increased symptoms or restriction to work towards return to prior level of function.      Status: [x] Progressing: [] Met: [x] Not Met: [] Adjusted  Patient will be able to perform all household chores including washing dishes and putting them away in the cabinets, vacuuming, and laundry without increased symptoms or restriction to work towards return to PLOF.    Status: [x] Progressing: [] Met: [x] Not Met: [] Adjusted  Patient will be able to comfortably drive at least 1 hour without increased symptoms or restriction in L UE.    Status: [x] Progressing: [] Met: [x] Not Met: [] Adjusted    TREATMENT PLAN     Frequency/Duration: 1-2x/week for 4-6 weeks for the following treatment interventions:    Interventions:  [x] Therapeutic exercise including: strength training, ROM, including postural re-education.   [x] NMR activation and proprioception, including postural re-education.    [x] Manual therapy as indicated to include: PROM, Gr I-IV mobilizations, STM, and Dry Needling/IASTM  [x] Modalities as needed that may include:  DN/estim  [x] Patient education on joint protection, postural re-education, activity modification, progression of HEP.        [] Aquatic Therapy    Plan: Cont manual techniques, self stretches to address posterior capsule, GHJ, CT junction, and T/S restrictions. Progress L shoulder strength.     Electronically Signed by Gloria

## 2024-05-31 ENCOUNTER — APPOINTMENT (OUTPATIENT)
Dept: PHYSICAL THERAPY | Age: 69
End: 2024-05-31
Payer: MEDICARE

## 2024-06-04 ENCOUNTER — HOSPITAL ENCOUNTER (OUTPATIENT)
Dept: PHYSICAL THERAPY | Age: 69
Setting detail: THERAPIES SERIES
Discharge: HOME | End: 2024-06-04
Payer: MEDICARE

## 2024-06-04 PROCEDURE — 97110 THERAPEUTIC EXERCISES: CPT

## 2024-06-04 PROCEDURE — 97140 MANUAL THERAPY 1/> REGIONS: CPT

## 2024-06-04 NOTE — FLOWSHEET NOTE
[facility] - Outpatient Rehabilitation and Therapy [address] [place] [address] office: [phone] fax: [phone]    Physical Therapy: TREATMENT/PROGRESS NOTE   Patient: Navya Domínguez (68 y.o. female)   Examination Date: 2024   :  1955 MRN: [id number]   Visit #: 25   Insurance Allowable Auth Needed   [place] []Yes    [x]No    Insurance: Payor: AETNA MEDICARE / Plan: AETNA MEDICARE-ADVANTAGE PPO / Product Type: Medicare /   Insurance ID: [id number] - (Medicare Managed)  Secondary Insurance (if applicable):    Treatment Diagnosis:   1. Left shoulder pain, unspecified chronicity  M25.512         2. Unspecified fracture of upper end of left humerus, initial encounter for closed fracture  S42         Medical Diagnosis:  Left shoulder pain [M25.512]   Referring Physician: Jose Angel Arriaga DO  PCP: Bird Joaquin PA       Plan of care signed (Y/N): Y    Date of Patient follow up with Physician:      Progress Report/POC: 24 Progress note nv.  POC update due: (10 visits /OR AUTH LIMITS, whichever is less)  2024                                             Precautions/ Contra-indications:           Latex allergy:  NO  Pacemaker:    NO  Contraindications for Manipulation: None  Date of Surgery: NA  Other:    Red Flags:  None    C-SSRS Triggered by Intake questionnaire:   [x] No, Questionnaire did not trigger screening.   [] Yes, Patient intake triggered further evaluation      [] C-SSRS Screening completed  [] PCP notified via Plan of Care  [] Emergency services notified     Preferred Language for Healthcare:   [x] English       [] other:    SUBJECTIVE EXAMINATION     Patient stated complaint:   States that she was sore after previous session, probably because she really didn't do a lot of exercise on her own last week with her family in town staying at her house all week. The soreness felt more like muscle fatigue soreness rather than the typical pain though. Lasted for

## 2024-06-06 ENCOUNTER — HOSPITAL ENCOUNTER (OUTPATIENT)
Dept: PHYSICAL THERAPY | Age: 69
Setting detail: THERAPIES SERIES
End: 2024-06-06
Payer: MEDICARE

## 2024-06-11 ENCOUNTER — HOSPITAL ENCOUNTER (OUTPATIENT)
Dept: PHYSICAL THERAPY | Age: 69
Setting detail: THERAPIES SERIES
Discharge: HOME | End: 2024-06-11
Payer: MEDICARE

## 2024-06-11 PROCEDURE — 97140 MANUAL THERAPY 1/> REGIONS: CPT

## 2024-06-11 PROCEDURE — 97110 THERAPEUTIC EXERCISES: CPT

## 2024-06-11 NOTE — FLOWSHEET NOTE
traction) for the purpose of modulating pain, promoting relaxation,  increasing ROM, reducing/eliminating soft tissue swelling/inflammation/restriction, improving soft tissue extensibility and allowing for proper ROM for normal function with self care, mobility, lifting and ambulation  (20560) Needle insertion(s) without injection; 1 or 2 muscle(s).  (20561) Needle insertion(s) without injection; 3 or more muscle(s)  (47886) ATTENDED ESTIM. Application of a modality to 1 or more areas; electrical stimulation (manual), each 15 minutes. Attended electrical stimulation requires direct (1-on-1) contact with the patient by the qualified professional/qualified personnel in providing electrical stimulation manually through the use of probes or other devices.      GOALS     Patient stated goal: Be able to do normal daily activities.  Status: [x] Progressing: [] Met: [x] Not Met: [] Adjusted  Therapist goals for Patient:   Short Term Goals: To be achieved in: 2 weeks  Independent in HEP and progression per patient tolerance, in order to progress toward full function and prevent re-injury.    Status: [] Progressing: [x] Met: [] Not Met: [] Adjusted  Patient will have a decrease in pain to </=2/10 to help facilitate improvement in movement, function, and ADLs as indicated by functional deficits.   Status: [x] Progressing: [] Met: [x] Not Met: [] Adjusted    Long Term Goals: To be achieved in: 4-6 weeks  Disability index score of 10% or less for the Quick DASH to assist with return top prior level of function.   Status: [x] Progressing: [] Met: [x] Not Met: [] Adjusted  Comment:  32% disability on Quick Dash as of 5/2/24  Patient will improve L shoulder ROM to within 5 degrees of uninvolved, dominant R UE  to allow for proper joint functioning as indicated by patients functional deficits.  Status: [x] Progressing: [] Met: [x] Not Met: [] Adjusted  Comment:  See objective measures above.  Patient will improve L shoulder strength

## 2024-06-18 ENCOUNTER — APPOINTMENT (OUTPATIENT)
Dept: PHYSICAL THERAPY | Age: 69
End: 2024-06-18
Payer: MEDICARE

## 2024-06-20 ENCOUNTER — HOSPITAL ENCOUNTER (OUTPATIENT)
Dept: PHYSICAL THERAPY | Age: 69
Setting detail: THERAPIES SERIES
Discharge: HOME | End: 2024-06-20
Payer: MEDICARE

## 2024-06-20 PROCEDURE — 97110 THERAPEUTIC EXERCISES: CPT

## 2024-06-20 PROCEDURE — 97140 MANUAL THERAPY 1/> REGIONS: CPT

## 2024-06-20 NOTE — PLAN OF CARE
prevent loss of range of motion, maintain or improve muscular strength or increase flexibility, following either an injury or surgery.   (15026) MANUAL THERAPY -  Manual therapy techniques, 1 or more regions, each 15 minutes (Mobilization/manipulation, manual lymphatic drainage, manual traction) for the purpose of modulating pain, promoting relaxation,  increasing ROM, reducing/eliminating soft tissue swelling/inflammation/restriction, improving soft tissue extensibility and allowing for proper ROM for normal function with self care, mobility, lifting and ambulation      GOALS     Patient stated goal: Be able to do normal daily activities.  Status: [x] Progressing: [] Met: [x] Not Met: [] Adjusted  Therapist goals for Patient:   Short Term Goals: To be achieved in: 2 weeks  Independent in HEP and progression per patient tolerance, in order to progress toward full function and prevent re-injury.    Status: [] Progressing: [x] Met: [] Not Met: [] Adjusted  Patient will have a decrease in pain to </=2/10 to help facilitate improvement in movement, function, and ADLs as indicated by functional deficits.   Status: [x] Progressing: [] Met: [x] Not Met: [] Adjusted    Long Term Goals: To be achieved in: 4-6 weeks  Disability index score of 10% or less for the Quick DASH to assist with return top prior level of function.   Status: [x] Progressing: [] Met: [] Not Met: [] Adjusted  Comment:  32% disability on Quick Dash as of 5/2/24  Patient will improve L shoulder ROM to within 5 degrees of uninvolved, dominant R UE  to allow for proper joint functioning as indicated by patients functional deficits.  Status: [x] Progressing: [] Met: [] Not Met: [] Adjusted  Comment:  See objective measures above.  Patient will improve L shoulder strength to within at least 5# HHD compared to uninvolved, dominant R UE to work towards return to PLOF.    Status: [x] Progressing: [] Met: [] Not Met: [] Adjusted  Patient will be able to perform

## 2024-06-25 ENCOUNTER — HOSPITAL ENCOUNTER (OUTPATIENT)
Dept: PHYSICAL THERAPY | Age: 69
Setting detail: THERAPIES SERIES
Discharge: HOME | End: 2024-06-25
Payer: MEDICARE

## 2024-06-25 PROCEDURE — 97140 MANUAL THERAPY 1/> REGIONS: CPT

## 2024-06-25 PROCEDURE — 97110 THERAPEUTIC EXERCISES: CPT

## 2024-06-25 NOTE — FLOWSHEET NOTE
[facility] - Outpatient Rehabilitation and Therapy [address] [place] [address] office: [phone] fax: [phone]      Physical Therapy: TREATMENT/PROGRESS NOTE   Patient: Navya Domínguez (68 y.o. female)   Examination Date: 2024   :  1955 MRN: [id number]   Visit #: 28   Insurance Allowable Auth Needed   MN []Yes    [x]No    Insurance: Payor: AETNA MEDICARE / Plan: AETNA MEDICARE-ADVANTAGE PPO / Product Type: Medicare /   Insurance ID: [id number] - (Medicare Managed)  Secondary Insurance (if applicable):    Treatment Diagnosis:   1. Left shoulder pain, unspecified chronicity  M25.512         2. Unspecified fracture of upper end of left humerus, initial encounter for closed fracture  S42         Medical Diagnosis:  Left shoulder pain [M25.512]   Referring Physician: Jose Angel Arriaga DO  PCP: Bird Joaquin PA       Plan of care signed (Y/N): Y    Date of Patient follow up with Physician:      Progress Report/POC:2024  POC update due: (10 visits /OR AUTH LIMITS, whichever is less)  2024                                             Precautions/ Contra-indications:           Latex allergy:  NO  Pacemaker:    NO  Contraindications for Manipulation: None  Date of Surgery: NA  Other:    Red Flags:  None    C-SSRS Triggered by Intake questionnaire:   [x] No, Questionnaire did not trigger screening.   [] Yes, Patient intake triggered further evaluation      [] C-SSRS Screening completed  [] PCP notified via Plan of Care  [] Emergency services notified     Preferred Language for Healthcare:   [x] English       [] other:    SUBJECTIVE EXAMINATION     Patient stated complaint:  Pt states back of shoulder was sore after last session but no increase in symptoms.      Test used Initial score  2024   Pain Summary VAS 4-7/10 0/10   Functional questionnaire Quick DASH 30% disability  23% disability   Other:              Pain:  Pain location: L superior, lateral

## 2024-07-02 ENCOUNTER — APPOINTMENT (OUTPATIENT)
Dept: PHYSICAL THERAPY | Age: 69
End: 2024-07-02
Payer: MEDICARE

## 2024-07-09 ENCOUNTER — HOSPITAL ENCOUNTER (OUTPATIENT)
Dept: PHYSICAL THERAPY | Age: 69
Setting detail: THERAPIES SERIES
Discharge: HOME | End: 2024-07-09
Payer: MEDICARE

## 2024-07-09 PROCEDURE — 97140 MANUAL THERAPY 1/> REGIONS: CPT

## 2024-07-09 PROCEDURE — 97110 THERAPEUTIC EXERCISES: CPT

## 2024-07-09 NOTE — FLOWSHEET NOTE
[facility] - Outpatient Rehabilitation and Therapy [address] [place] [address] office: [phone] fax: [phone]      Physical Therapy: TREATMENT/PROGRESS NOTE   Patient: Navya Domínguez (68 y.o. female)   Examination Date: 2024   :  1955 MRN: [id number]   Visit #: 29   Insurance Allowable Auth Needed   MN []Yes    [x]No    Insurance: Payor: AETNA MEDICARE / Plan: AETNA MEDICARE-ADVANTAGE PPO / Product Type: Medicare /   Insurance ID: [id number] - (Medicare Managed)  Secondary Insurance (if applicable):    Treatment Diagnosis:   1. Left shoulder pain, unspecified chronicity  M25.512         2. Unspecified fracture of upper end of left humerus, initial encounter for closed fracture  S42         Medical Diagnosis:  Left shoulder pain [M25.512]   Referring Physician: Jose Angel Arriaga DO  PCP: Bird Joaquin PA       Plan of care signed (Y/N): Y    Date of Patient follow up with Physician:      Progress Report/POC:2024  POC update due: (10 visits /OR AUTH LIMITS, whichever is less)  2024                                             Precautions/ Contra-indications:           Latex allergy:  NO  Pacemaker:    NO  Contraindications for Manipulation: None  Date of Surgery: NA  Other:    Red Flags:  None    C-SSRS Triggered by Intake questionnaire:   [x] No, Questionnaire did not trigger screening.   [] Yes, Patient intake triggered further evaluation      [] C-SSRS Screening completed  [] PCP notified via Plan of Care  [] Emergency services notified     Preferred Language for Healthcare:   [x] English       [] other:    SUBJECTIVE EXAMINATION     Patient stated complaint:  Pt states her shoulder blade/back of shoulder was sore after last session, feels okay right now.     Test used Initial score  2024   Pain Summary VAS 4-7/10 0/10   Functional questionnaire Quick DASH 30% disability  23% disability   Other:              Pain:  Pain location: L

## 2024-07-16 ENCOUNTER — APPOINTMENT (OUTPATIENT)
Dept: PHYSICAL THERAPY | Age: 69
End: 2024-07-16
Payer: MEDICARE

## 2024-07-17 ENCOUNTER — HOSPITAL ENCOUNTER (OUTPATIENT)
Dept: PHYSICAL THERAPY | Age: 69
Setting detail: THERAPIES SERIES
Discharge: HOME | End: 2024-07-17
Payer: MEDICARE

## 2024-07-17 PROCEDURE — 97110 THERAPEUTIC EXERCISES: CPT

## 2024-07-17 PROCEDURE — 97140 MANUAL THERAPY 1/> REGIONS: CPT

## 2024-07-17 NOTE — FLOWSHEET NOTE
extension       Cerv SB       Cerv rotation                 SHOULDER Flexion       Abduction       ER -0       ER -90       IR -0       IR -90               ELBOW Flex/biceps       Ext/triceps       Pronation       supination                 WRIST Flexion       Extension       RD       UD               Posture:   rounded shoulders and flat T/S    Specific Joint Mobility Testing/Accessory Motions:      Glenohumeral joint: hypomobile on R on L  T/S, CT junction mobility very restricted    Special Tests:  [x] None Assessed   [] Following tests noted:    NT       Exercises/Interventions     Therapeutic Ex (02201)  resistance Sets/time Reps Notes/Cues/Progressions   Supine cane OH flexion - varying angles 3# 5s 10 Each angle   Wall walks Red TB 2 5    Wall washes - OH  1 10 Tacile, verbal cues for scap mechanics   Doorway ER stretch - 60 deg elevation       Supine posterior capsule stretch  20s 3    Cat cow     Tactile, verbal cues    SL thoracic open books, hand OH       TRX walkout OH stretch   10    Scaption wall slide   10 Shoulder height    TB ER/IR Green TB   Each    Strap stretch for IR  10s 10           Manual Intervention (13145)  TIME     L GHJ inferior, posterior glides  8  Gr. II-III; sidelying, supine   L shoulder PROM, gentle stretching  8  Sidelying, supine   Prone posterior capsule mobs  0  Gr. II-III   Posterior mobs - hand on top of head  0     CT junction, T/S mobs  8     STM- UT, subscap  0     NMR re-education (70326) resistance Sets/time Reps CUES NEEDED   Wall push up plus  nv                                 Therapeutic Activity (50259)  Sets/time                                          Modalities:    No modalities applied this session    Education/Home Exercise Program: HEP discussed and performed, see exercise grid        ASSESSMENT     Today's Assessment:   Pt continues to demonstrate slow improvement in L shoulder mobility and strength as noted above. Decreased pain with overhead end range

## 2024-07-23 ENCOUNTER — HOSPITAL ENCOUNTER (OUTPATIENT)
Dept: PHYSICAL THERAPY | Age: 69
Setting detail: THERAPIES SERIES
Discharge: HOME | End: 2024-07-23
Payer: MEDICARE

## 2024-07-23 PROCEDURE — 97140 MANUAL THERAPY 1/> REGIONS: CPT

## 2024-07-23 PROCEDURE — 97110 THERAPEUTIC EXERCISES: CPT

## 2024-07-23 NOTE — FLOWSHEET NOTE
[facility] - Outpatient Rehabilitation and Therapy [address] [place] [id number] office: [phone] fax: [phone]      Physical Therapy: TREATMENT/PROGRESS NOTE   Patient: Navya Domínguez (68 y.o. female)   Examination Date: 2024   :  1955 MRN: [id number]   Visit #: 31   Insurance Allowable Auth Needed   [place] []Yes    [x]No    Insurance: Payor: AETNA MEDICARE / Plan: AETNA MEDICARE-ADVANTAGE PPO / Product Type: Medicare /   Insurance ID: [id number] - (Medicare Managed)  Secondary Insurance (if applicable):    Treatment Diagnosis:   1. Left shoulder pain, unspecified chronicity  M25.512         2. Unspecified fracture of upper end of left humerus, initial encounter for closed fracture  S42         Medical Diagnosis:  Left shoulder pain [M25.512]   Referring Physician: Jose Angel Arriaga DO  PCP: Bird Joaquin PA       Plan of care signed (Y/N): Y    Date of Patient follow up with Physician:      Progress Report/POC:2024  POC update due: (10 visits /OR AUTH LIMITS, whichever is less)  2024                                             Precautions/ Contra-indications:           Latex allergy:  NO  Pacemaker:    NO  Contraindications for Manipulation: None  Date of Surgery: NA  Other:    Red Flags:  None    C-SSRS Triggered by Intake questionnaire:   [x] No, Questionnaire did not trigger screening.   [] Yes, Patient intake triggered further evaluation      [] C-SSRS Screening completed  [] PCP notified via Plan of Care  [] Emergency services notified     Preferred Language for Healthcare:   [x] English       [] other:    SUBJECTIVE EXAMINATION     Patient stated complaint:  Pt states shoulder is feeling okay. States she was only a little sore after last session.      Test used Initial score  2024   Pain Summary VAS 4-7/10 210   Functional questionnaire Quick DASH 30% disability  23% disability   Other:              Pain:  Pain location: L superior,

## 2024-07-31 ENCOUNTER — HOSPITAL ENCOUNTER (OUTPATIENT)
Dept: PHYSICAL THERAPY | Age: 69
Setting detail: THERAPIES SERIES
Discharge: HOME | End: 2024-07-31
Payer: MEDICARE

## 2024-07-31 PROCEDURE — 97110 THERAPEUTIC EXERCISES: CPT

## 2024-07-31 PROCEDURE — 97140 MANUAL THERAPY 1/> REGIONS: CPT

## 2024-07-31 NOTE — FLOWSHEET NOTE
lateral shoulder  Patient describes pain to be constant, intermittent, Sharp, dull, and aching  Pain decreases with: Medication  Pain increases with: Activity and Movement, reaching behind her back, bathing, dressing, driving, reaching to put her seat belt on with her L arm, sleeping directly on the L side, lifting any weight >5# with L UE     Relevant Medical History: High BP. Anxiety. History of adhesive capsulitis in both shoulders.    Occupation/School:  Work/School Status: Retired    Job Duties/Demands: Takes care of her grand kids now as needed    Sport/ Recreation/ Leisure/ Hobbies: Patient wanted to start working out in January, but she hasn't been able to do that because of this injury.     OBJECTIVE EXAMINATION     6/20/2024   Mvmt (norm) AROM L AROM R Notes            CERVICAL Flex (60)      Ext (70)      SB(45)       Rotation (80)                 SHOULDER Flexion (180) 140/160 Shaky, feels weak.  155     Abduction (180) 100/140 Shaky, feels weak. 142     ER -0 C5/ 90 in scapular plane; 85 from 90/90  C5 Discomfort in posterior R shoulder.     ER -90 (90)       IR -0 L buttock, hip T8     IR -90 (70)                 5/2/24  ROM/Strength: (Blank cells denote NT)    Mvmt (norm) AROM L AROM R Notes PROM L PROM R Notes               CERVICAL Flex (60)        Ext (70)        SB(45)          Rotation (80)                       SHOULDER Flexion (180) 130/160 Shaky, feels weak.  155        Abduction (180) 110/140 Shaky, feels weak. 142        ER -0 C5/ 90 in scapular plane; 85 from 90/90  C5 Discomfort in posterior R shoulder.        ER -90 (90)          IR -0 L buttock, hip T8        IR -90 (70)                     ELBOW Flex/biceps (140)          Ext/triceps (0)          Pronation (80)          Supination (80)                       WRIST Flexion (60)          Extension (60)          RD (20)          UD (20)                                   MMT L MMT R Notes            CERVICAL Cerv flexion

## 2024-08-21 ENCOUNTER — HOSPITAL ENCOUNTER (OUTPATIENT)
Dept: PHYSICAL THERAPY | Age: 69
Setting detail: THERAPIES SERIES
Discharge: HOME | End: 2024-08-21
Payer: MEDICARE

## 2024-08-21 PROCEDURE — 97140 MANUAL THERAPY 1/> REGIONS: CPT

## 2024-08-21 PROCEDURE — 97110 THERAPEUTIC EXERCISES: CPT

## 2024-08-21 NOTE — PLAN OF CARE
[facility] - Outpatient Rehabilitation and Therapy [address] [place] [address] office: [phone] fax: [phone]      Physical Therapy Re-Certification Plan of Care/MD UPDATE    Dear  Dr. Jose Angel Arriaga,    We had the pleasure of treating the following patient for physical therapy services at [facility].  A summary of our findings can be found in the updated assessment below.  This includes our plan of care.  If you have any questions or concerns regarding these findings, please do not hesitate to contact me at the office phone number checked above.  Thank you for the referral.     Physician Signature:________________________________Date:__________________  By signing above (or electronic signature), therapist’s plan is approved by physician      Current Functional Status:   Navya Domínguez 1955 continues to present with functional deficits in ROM, joint mobility, strength symmetry, and endurance of strength  limiting ability with reaching overhead, carrying items, lifting items, and pushing or pulling activity .  During therapy this date, patient required verbal cueing, tactile cueing, progression of exercises and program, and manual interventions for exercise progression, promoting relaxation, improving soft tissue extensibility, allowing for proper ROM, and improving postural awareness. Patient will continue to benefit from ongoing evaluation and advanced clinical decision from a Physical Therapist to improve ROM, muscle strength, endurance, ADL status, and proper body mechanics to safely return to PLOF and ADLs/IADLs without symptoms or restrictions.    Overall Response to Treatment:   [x]Patient is responding well to treatment and improvement is noted with regards to goals   []Patient should continue to improve in reasonable time if they continue HEP   []Patient has plateaued and is no longer responding to skilled PT intervention    []Patient is getting

## 2024-09-05 ENCOUNTER — HOSPITAL ENCOUNTER (OUTPATIENT)
Dept: PHYSICAL THERAPY | Age: 69
Setting detail: THERAPIES SERIES
Discharge: HOME | End: 2024-09-05
Payer: MEDICARE

## 2024-09-05 PROCEDURE — 97140 MANUAL THERAPY 1/> REGIONS: CPT

## 2024-09-05 PROCEDURE — 97110 THERAPEUTIC EXERCISES: CPT

## 2024-09-05 NOTE — FLOWSHEET NOTE
[facility] - Outpatient Rehabilitation and Therapy [address] [place] [address] office: [phone] fax: [phone]    Physical Therapy: TREATMENT/PROGRESS NOTE   Patient: Navya Domínguez (69 y.o. female)   Examination Date: 2024   :  1955 MRN: [id number]   Visit #: 34   Insurance Allowable Auth Needed   MN []Yes    [x]No    Insurance: Payor: AETNA MEDICARE / Plan: AETNA MEDICARE-ADVANTAGE PPO / Product Type: Medicare /   Insurance ID: [id number] - (Medicare Managed)  Secondary Insurance (if applicable):    Treatment Diagnosis:   1. Left shoulder pain, unspecified chronicity  M25.512         2. Unspecified fracture of upper end of left humerus, initial encounter for closed fracture  S42         Medical Diagnosis:  Left shoulder pain [M25.512]   Referring Physician: Jose Angel Arriaga DO  PCP: Bird Joaquin PA       Plan of care signed (Y/N): Y    Date of Patient follow up with Physician:      Progress Report/POC: 2024  POC update due: (10 visits /OR AUTH LIMITS, whichever is less)  10/4/2024                                             Precautions/ Contra-indications:           Latex allergy:  NO  Pacemaker:    NO  Contraindications for Manipulation: None  Date of Surgery: NA  Other:    Red Flags:  None    C-SSRS Triggered by Intake questionnaire:   [x] No, Questionnaire did not trigger screening.   [] Yes, Patient intake triggered further evaluation      [] C-SSRS Screening completed  [] PCP notified via Plan of Care  [] Emergency services notified     Preferred Language for Healthcare:   [x] English       [] other:    SUBJECTIVE EXAMINATION     Patient stated complaint:  Pt states shoulder is doing well, still tight and uncomfortable reaching behind back.     Test used Initial score  2024   Pain Summary VAS 4-7/10 1/10   Functional questionnaire Quick DASH 30% disability  18% disability   Other:              Pain:  Pain location: L superior, lateral

## 2024-09-17 ENCOUNTER — APPOINTMENT (OUTPATIENT)
Dept: PHYSICAL THERAPY | Age: 69
End: 2024-09-17
Payer: MEDICARE

## 2024-09-19 ENCOUNTER — HOSPITAL ENCOUNTER (OUTPATIENT)
Dept: PHYSICAL THERAPY | Age: 69
Setting detail: THERAPIES SERIES
Discharge: HOME | End: 2024-09-19
Payer: MEDICARE

## 2024-09-19 PROCEDURE — 97110 THERAPEUTIC EXERCISES: CPT

## 2024-09-19 PROCEDURE — 97140 MANUAL THERAPY 1/> REGIONS: CPT

## 2024-10-03 ENCOUNTER — HOSPITAL ENCOUNTER (OUTPATIENT)
Dept: PHYSICAL THERAPY | Age: 69
Setting detail: THERAPIES SERIES
Discharge: HOME | End: 2024-10-03
Payer: MEDICARE

## 2024-10-03 PROCEDURE — 97140 MANUAL THERAPY 1/> REGIONS: CPT

## 2024-10-03 PROCEDURE — 97110 THERAPEUTIC EXERCISES: CPT

## 2024-10-03 NOTE — FLOWSHEET NOTE
[facility] - Outpatient Rehabilitation and Therapy [address] [place] [address] office: [phone] fax: [phone]    Physical Therapy: TREATMENT/PROGRESS NOTE   Patient: Navya Domínguez (69 y.o. female)   Examination Date: 10/03/2024   :  1955 MRN: [id number]   Visit #: 36   Insurance Allowable Auth Needed   MN []Yes    [x]No    Insurance: Payor: AETNA MEDICARE / Plan: AETNA MEDICARE-ADVANTAGE PPO / Product Type: Medicare /   Insurance ID: [id number] - (Medicare Managed)  Secondary Insurance (if applicable):    Treatment Diagnosis:   1. Left shoulder pain, unspecified chronicity  M25.512         2. Unspecified fracture of upper end of left humerus, initial encounter for closed fracture  S42         Medical Diagnosis:  Left shoulder pain [M25.512]   Referring Physician: Jose Angel Arriaga DO  PCP: Bird Joaquin PA       Plan of care signed (Y/N): Y    Date of Patient follow up with Physician:      Progress Report/POC: 2024  POC update due: (10 visits /OR AUTH LIMITS, whichever is less)  2024                                             Precautions/ Contra-indications:           Latex allergy:  NO  Pacemaker:    NO  Contraindications for Manipulation: None  Date of Surgery: NA  Other:    Red Flags:  None    C-SSRS Triggered by Intake questionnaire:   [x] No, Questionnaire did not trigger screening.   [] Yes, Patient intake triggered further evaluation      [] C-SSRS Screening completed  [] PCP notified via Plan of Care  [] Emergency services notified     Preferred Language for Healthcare:   [x] English       [] other:    SUBJECTIVE EXAMINATION     Patient stated complaint:  Still uncomfortable, challenging reaching behind her back with her L arm.  Feels weak.     Test used Initial score  2/26/24 10/03/2024   Pain Summary VAS 4-7/10 10   Functional questionnaire Quick DASH 30% disability  18% disability   Other:              Pain:  Pain location: L superior, lateral

## 2024-10-17 ENCOUNTER — HOSPITAL ENCOUNTER (OUTPATIENT)
Dept: PHYSICAL THERAPY | Age: 69
Setting detail: THERAPIES SERIES
Discharge: HOME | End: 2024-10-17
Payer: MEDICARE

## 2024-10-17 PROCEDURE — 97140 MANUAL THERAPY 1/> REGIONS: CPT

## 2024-10-17 PROCEDURE — 97110 THERAPEUTIC EXERCISES: CPT

## 2024-10-17 NOTE — FLOWSHEET NOTE
[phone]) [phone] 1  Estim Unattended (12258)     Ther. Act (82930)    Mech. Traction (46727)     Gait (31706)    Dry Needle 1-2 muscle (20560)     Aquatic Therex (97113)    Dry Needle 3+ muscle (20561)     Iontophoresis (37313)    VASO (07817)     Ultrasound (26806)    Group Therapy (83140)     Estim Attended (44730)    Canalith Repositioning (57315)     Other:    Other:    Total Timed Code Tx Minutes 41 3       Total Treatment Minutes 41        Charge Justification:  (41879) THERAPEUTIC EXERCISE - Provided verbal/tactile cueing for activities related to strengthening, flexibility, endurance, ROM performed to prevent loss of range of motion, maintain or improve muscular strength or increase flexibility, following either an injury or surgery.   (02753) MANUAL THERAPY -  Manual therapy techniques, 1 or more regions, each 15 minutes (Mobilization/manipulation, manual lymphatic drainage, manual traction) for the purpose of modulating pain, promoting relaxation,  increasing ROM, reducing/eliminating soft tissue swelling/inflammation/restriction, improving soft tissue extensibility and allowing for proper ROM for normal function with self care, mobility, lifting and ambulation      GOALS     Patient stated goal: Be able to do normal daily activities.  Status: [x] Progressing: [] Met: [x] Not Met: [] Adjusted  Therapist goals for Patient:   Short Term Goals: To be achieved in: 2 weeks  Independent in HEP and progression per patient tolerance, in order to progress toward full function and prevent re-injury.    Status: [] Progressing: [x] Met: [] Not Met: [] Adjusted  Patient will have a decrease in pain to </=2/10 to help facilitate improvement in movement, function, and ADLs as indicated by functional deficits.   Status: [] Progressing: [x] Met: [] Not Met: [] Adjusted    Long Term Goals: To be achieved in: 4-6 weeks  Disability index score of 10% or less for the Quick DASH to assist with return top prior level of

## 2024-10-25 NOTE — FLOWSHEET NOTE
sleeve, etc without increased symptoms or restriction to work towards return to prior level of function.        Status: [x] Progressing: [] Met: [x] Not Met: [] Adjusted  Patient will be able to perform all household chores including washing dishes and putting them away in the cabinets, vacuuming, and laundry without increased symptoms or restriction to work towards return to PLOF.    Status: [x] Progressing: [] Met: [x] Not Met: [] Adjusted  Patient will be able to comfortably drive at least 1 hour without increased symptoms or restriction in L UE.    Status: [x] Progressing: [] Met: [x] Not Met: [] Adjusted    TREATMENT PLAN     Frequency/Duration: 1-2x/week for 4-6 weeks for the following treatment interventions:    Interventions:  [x] Therapeutic exercise including: strength training, ROM, including postural re-education.   [x] NMR activation and proprioception, including postural re-education.    [x] Manual therapy as indicated to include: PROM, Gr I-IV mobilizations, STM, and Dry Needling/IASTM  [x] Modalities as needed that may include:  DN/estim  [x] Patient education on joint protection, postural re-education, activity modification, progression of HEP.        [] Aquatic Therapy    Plan: 2x per week for 4-6 weeks    Electronically Signed by Gloria Moraes PT  Date: 04/15/2024    Note: Portions of this note have been templated and/or copied from initial evaluation, reassessments and prior notes for documentation efficiency.         PAST SURGICAL HISTORY:  Acid Reflux     History of myringotomy     Premature Birth 37 1/ 2 wks - 6 lbs 10 oz

## 2024-11-01 ENCOUNTER — HOSPITAL ENCOUNTER (OUTPATIENT)
Dept: PHYSICAL THERAPY | Age: 69
Setting detail: THERAPIES SERIES
Discharge: HOME | End: 2024-11-01
Payer: MEDICARE

## 2024-11-01 PROCEDURE — 97110 THERAPEUTIC EXERCISES: CPT

## 2024-11-01 PROCEDURE — 97140 MANUAL THERAPY 1/> REGIONS: CPT

## 2024-11-01 NOTE — FLOWSHEET NOTE
[facility] - Outpatient Rehabilitation and Therapy [address] [place] [address] office: [phone] fax: [phone]    Physical Therapy: TREATMENT/PROGRESS NOTE   Patient: Navya Domínguez (69 y.o. female)   Examination Date: 2024   :  1955 MRN: [id number]   Visit #: 38   Insurance Allowable Auth Needed   MN []Yes    [x]No    Insurance: Payor: AETNA MEDICARE / Plan: AETNA MEDICARE-ADVANTAGE PPO / Product Type: Medicare /   Insurance ID: [id number] - (Medicare Managed)  Secondary Insurance (if applicable):    Treatment Diagnosis:   1. Left shoulder pain, unspecified chronicity  M25.512         2. Unspecified fracture of upper end of left humerus, initial encounter for closed fracture  S42         Medical Diagnosis:  Left shoulder pain [M25.512]   Referring Physician: Jose Angel Arriaga DO  PCP: Bird Joaquin PA       Plan of care signed (Y/N): Y    Date of Patient follow up with Physician:      Progress Report/POC: 2024  POC update due: (10 visits /OR AUTH LIMITS, whichever is less)  2024                                             Precautions/ Contra-indications:           Latex allergy:  NO  Pacemaker:    NO  Contraindications for Manipulation: None  Date of Surgery: NA  Other:    Red Flags:  None    C-SSRS Triggered by Intake questionnaire:   [x] No, Questionnaire did not trigger screening.   [] Yes, Patient intake triggered further evaluation      [] C-SSRS Screening completed  [] PCP notified via Plan of Care  [] Emergency services notified     Preferred Language for Healthcare:   [x] English       [] other:    SUBJECTIVE EXAMINATION     Patient stated complaint:  Still uncomfortable, challenging reaching behind her back with her L arm.  Feels weak, but working on it on her own at home.      Test used Initial score  2024   Pain Summary VAS 4-7/10 210   Functional questionnaire Quick DASH 30% disability  25% disability   Other:

## 2024-11-15 ENCOUNTER — HOSPITAL ENCOUNTER (OUTPATIENT)
Dept: PHYSICAL THERAPY | Age: 69
Setting detail: THERAPIES SERIES
Discharge: HOME | End: 2024-11-15
Payer: MEDICARE

## 2024-11-15 PROCEDURE — 97110 THERAPEUTIC EXERCISES: CPT

## 2024-11-15 PROCEDURE — 97140 MANUAL THERAPY 1/> REGIONS: CPT

## 2024-11-15 NOTE — DISCHARGE SUMMARY
weak, but working on it on her own at home.      Test used Initial score  2/26/24 11/15/2024   Pain Summary VAS 4-7/10 2/10   Functional questionnaire Quick DASH 30% disability  11% disability   Other:              Pain:  Pain location: L superior, lateral shoulder  Patient describes pain to be constant, intermittent, Sharp, dull, and aching  Pain decreases with: Medication  Pain increases with: Activity and Movement, reaching behind her back, bathing, dressing, driving, reaching to put her seat belt on with her L arm, sleeping directly on the L side, lifting any weight >5# with L UE     Relevant Medical History: High BP. Anxiety. History of adhesive capsulitis in both shoulders.    Occupation/School:  Work/School Status: Retired    Job Duties/Demands: Takes care of her grand kids now as needed    Sport/ Recreation/ Leisure/ Hobbies: Patient wanted to start working out in January, but she hasn't been able to do that because of this injury.     OBJECTIVE EXAMINATION     11/15/2024  ROM/Strength: (Blank cells denote NT)    Mvmt (norm) AROM L AROM R Notes PROM L PROM R Notes               CERVICAL Flex (60)        Ext (70)        SB(45)          Rotation (80)                       SHOULDER Flexion (180) 130/160  155        Abduction (180) 115/140 Shaky, feels weak. 142        ER -0 C5/ 90 in scapular plane; 85 from 90/90  C5 Discomfort in posterior R shoulder.        ER -90 (90)          IR -0 Sacrum T8        IR -90 (70)                     ELBOW Flex/biceps (140)          Ext/triceps (0)          Pronation (80)          Supination (80)                       WRIST Flexion (60)          Extension (60)          RD (20)          UD (20)                               Posture:   rounded shoulders and flat T/S    Specific Joint Mobility Testing/Accessory Motions:      Glenohumeral joint: hypomobile on R on L  T/S, CT junction mobility very restricted    Special Tests:  [x] None Assessed   [] Following tests